# Patient Record
Sex: FEMALE | Race: WHITE
[De-identification: names, ages, dates, MRNs, and addresses within clinical notes are randomized per-mention and may not be internally consistent; named-entity substitution may affect disease eponyms.]

---

## 2019-04-18 ENCOUNTER — HOSPITAL ENCOUNTER (OUTPATIENT)
Dept: HOSPITAL 88 - ER | Age: 39
Setting detail: OBSERVATION
LOS: 2 days | Discharge: HOME | End: 2019-04-20
Attending: INTERNAL MEDICINE | Admitting: INTERNAL MEDICINE
Payer: COMMERCIAL

## 2019-04-18 VITALS — DIASTOLIC BLOOD PRESSURE: 94 MMHG | SYSTOLIC BLOOD PRESSURE: 136 MMHG

## 2019-04-18 VITALS — BODY MASS INDEX: 40.37 KG/M2 | HEIGHT: 70 IN | WEIGHT: 282 LBS

## 2019-04-18 VITALS — SYSTOLIC BLOOD PRESSURE: 136 MMHG | DIASTOLIC BLOOD PRESSURE: 94 MMHG

## 2019-04-18 DIAGNOSIS — K35.80: Primary | ICD-10-CM

## 2019-04-18 LAB
ALBUMIN SERPL-MCNC: 4 G/DL (ref 3.5–5)
ALBUMIN/GLOB SERPL: 1.1 {RATIO} (ref 0.8–2)
ALP SERPL-CCNC: 100 IU/L (ref 40–150)
ALT SERPL-CCNC: 75 IU/L (ref 0–55)
ANION GAP SERPL CALC-SCNC: 17.6 MMOL/L (ref 8–16)
BACTERIA URNS QL MICRO: (no result) /HPF
BASOPHILS NFR BLD AUTO: 0 % (ref 0–1)
BILIRUB UR QL: NEGATIVE
BUN SERPL-MCNC: 12 MG/DL (ref 7–26)
BUN/CREAT SERPL: 16 (ref 6–25)
CALCIUM SERPL-MCNC: 10.5 MG/DL (ref 8.4–10.2)
CHLORIDE SERPL-SCNC: 105 MMOL/L (ref 98–107)
CLARITY UR: (no result)
CO2 SERPL-SCNC: 18 MMOL/L (ref 22–29)
COLOR UR: YELLOW
DEPRECATED RBC URNS MANUAL-ACNC: (no result) /HPF (ref 0–5)
EGFRCR SERPLBLD CKD-EPI 2021: > 60 ML/MIN (ref 60–?)
EOSINOPHIL NFR BLD AUTO: 0.1 % (ref 0–6)
EPI CELLS URNS QL MICRO: (no result) /LPF
ERYTHROCYTE [DISTWIDTH] IN CORD BLOOD: 13.5 % (ref 11.7–14.4)
GLOBULIN PLAS-MCNC: 3.7 G/DL (ref 2.3–3.5)
GLUCOSE SERPLBLD-MCNC: 133 MG/DL (ref 74–118)
HCG UR QL: NEGATIVE
HCT VFR BLD AUTO: 42.6 % (ref 34.2–44.1)
HGB BLD-MCNC: 14.4 G/DL (ref 12–16)
KETONES UR QL STRIP.AUTO: (no result)
LEUKOCYTE ESTERASE UR QL STRIP.AUTO: NEGATIVE
LYMPHOCYTES NFR BLD AUTO: 1.6 % (ref 18–39.1)
MCH RBC QN AUTO: 28.6 PG (ref 28–32)
MCHC RBC AUTO-ENTMCNC: 33.8 G/DL (ref 31–35)
MCV RBC AUTO: 84.7 FL (ref 81–99)
MONOCYTES NFR BLD AUTO: 0.9 % (ref 4.4–11.3)
MUCOUS THREADS URNS QL MICRO: (no result)
NEUTS SEG NFR BLD AUTO: 14 % (ref 38.7–80)
NITRITE UR QL STRIP.AUTO: NEGATIVE
PLATELET # BLD AUTO: 411 X10E3/UL (ref 140–360)
POTASSIUM SERPL-SCNC: 3.6 MMOL/L (ref 3.5–5.1)
PROT UR QL STRIP.AUTO: NEGATIVE
RBC # BLD AUTO: 5.03 X10E6/UL (ref 3.6–5.1)
SODIUM SERPL-SCNC: 137 MMOL/L (ref 136–145)
SP GR UR STRIP: 1.01 (ref 1.01–1.02)
UROBILINOGEN UR STRIP-MCNC: 0.2 MG/DL (ref 0.2–1)
WBC #/AREA URNS HPF: (no result) /HPF (ref 0–5)

## 2019-04-18 PROCEDURE — 44970 LAPAROSCOPY APPENDECTOMY: CPT

## 2019-04-18 PROCEDURE — 85025 COMPLETE CBC W/AUTO DIFF WBC: CPT

## 2019-04-18 PROCEDURE — 74177 CT ABD & PELVIS W/CONTRAST: CPT

## 2019-04-18 PROCEDURE — 99284 EMERGENCY DEPT VISIT MOD MDM: CPT

## 2019-04-18 PROCEDURE — 96376 TX/PRO/DX INJ SAME DRUG ADON: CPT

## 2019-04-18 PROCEDURE — 88304 TISSUE EXAM BY PATHOLOGIST: CPT

## 2019-04-18 PROCEDURE — 80053 COMPREHEN METABOLIC PANEL: CPT

## 2019-04-18 PROCEDURE — 81025 URINE PREGNANCY TEST: CPT

## 2019-04-18 PROCEDURE — 96374 THER/PROPH/DIAG INJ IV PUSH: CPT

## 2019-04-18 PROCEDURE — 36415 COLL VENOUS BLD VENIPUNCTURE: CPT

## 2019-04-18 PROCEDURE — 96375 TX/PRO/DX INJ NEW DRUG ADDON: CPT

## 2019-04-18 PROCEDURE — 81001 URINALYSIS AUTO W/SCOPE: CPT

## 2019-04-18 RX ADMIN — TAZOBACTAM SODIUM AND PIPERACILLIN SODIUM SCH MLS/HR: 375; 3 INJECTION, SOLUTION INTRAVENOUS at 21:33

## 2019-04-18 RX ADMIN — HYDROMORPHONE HYDROCHLORIDE PRN MG: 2 INJECTION INTRAMUSCULAR; INTRAVENOUS; SUBCUTANEOUS at 21:37

## 2019-04-18 RX ADMIN — SODIUM CHLORIDE SCH MLS/HR: 9 INJECTION, SOLUTION INTRAVENOUS at 21:33

## 2019-04-18 RX ADMIN — SODIUM CHLORIDE PRN MG: 900 INJECTION INTRAVENOUS at 21:37

## 2019-04-18 NOTE — DIAGNOSTIC IMAGING REPORT
EXAMINATION: CT of the abdomen and pelvis with contrast.



TECHNIQUE: 

Helical CT images of the abdomen and pelvis were performed from the lung bases

to the lesser trochanters after the intravenous administration of 100 cc of

Omnipaque 300 and the oral administration of none.  Coronal and sagittal

reformatted images were obtained.Dose modulation, iterative reconstruction,

and/or weight based adjustment of the mA/kV was utilized to reduce the

radiation dose to as low as reasonably achievable. 



COMPARISON:  None.



CLINICAL HISTORY:Severe abdominal pain

     

DISCUSSION:



ABDOMEN/PELVIS:



LOWER THORAX:Unremarkable.



HEPATOBILIARY: No focal hepatic lesions.  No intra-or extrahepatic biliary

ductal dilation.  The gallbladder is normal.   



SPLEEN: No splenomegaly.



PANCREAS: No focal masses or ductal dilatation. 



ADRENALS: No adrenal nodules.



KIDNEYS/URETERS: No hydronephrosis, stones, or solid mass lesions.



PELVIC ORGANS/BLADDER: The bladder is normal.  



PERITONEUM/RETROPERITONEUM: No free air or fluid.



LYMPH NODES: No intra-abdominal, retroperitoneal, pelvic or inguinal

lymphadenopathy.



VESSELS: The celiac trunk,superior and inferior mesenteric and bilateral  renal

arteries are patent  The portal, superior mesenteric and splenic veins are

patent.



GI TRACT: Appendix mildly dilated measuring 9 mm with adjacent stranding. Lap

Band appropriate position.



BONES AND SOFT TISSUE: No bony destructive lesions.    No soft tissue

abnormalities.  



IMPRESSION: 



Acute uncomplicated appendicitis.



Signed by: Dr. Andrew Palisch, M.D. on 4/18/2019 5:25 PM

## 2019-04-18 NOTE — XMS REPORT
Patient Summary Document

                             Created on: 2019



SHIRA ENG

External Reference #: [id number]

: 1980

Sex: Female



Demographics







                          Address                   [address] [place], [place] [id number]

 

                          Home Phone                [phone]

 

                          Preferred Language        Unknown

 

                          Marital Status            Unknown

 

                          Caodaism Affiliation     Unknown

 

                          Race                      Unknown

 

                                        Additional Race(s)  

 

                          Ethnic Group              Unknown





Author







                          Author                    [facility]

 

                          Address                   Unknown

 

                          Phone                     Unavailable







Care Team Providers







                    Care Team Member Name    Role                Phone

 

                    EDITA BARNES    Unavailable         Unavailable







Problems

This patient has no known problems.



Allergies, Adverse Reactions, Alerts

This patient has no known allergies or adverse reactions.



Medications

This patient has no known medications.



Results







           Test Description    Test Time    Test Comments    Text Results    Atomic Results    Result

 Comments

 

                CT ABDOMEN/PELVIS W    2019 17:21:00                                                       

                                                   [facility]                        [address] [place] [address]      Patient Name: SHIRA ENG                          
        MR #: [id number]                     : 1980                    
              Age/Sex: 38/F  Acct #: [id number]                              
Req #: [id number]  Adm Physician:                                               
      Ordered by: ANA LUISA BARNES MD                            Report #: [id number]
[id number]        Location: ER                                      Room/Bed:         
           
___________________________________________________________________________________________________
   Procedure: [id number] CT/CT ABDOMEN/PELVIS W  Exam Date:                      
      Exam Time:                                               REPORT STATUS: 
Signed    EXAMINATION: CT of the abdomen and pelvis with contrast.      
TECHNIQUE:    Helical CT images of the abdomen and pelvis were performed from 
the lung bases   to the lesser trochanters after the intravenous administration 
of 100 cc of   Omnipaque 300 and the oral administration of none.  Coronal and 
sagittal   reformatted images were obtained.Dose modulation, iterative 
reconstruction,   and/or weight based adjustment of the mA/kV was utilized to 
reduce the   radiation dose to as low as reasonably achievable.       
COMPARISON:  None.      CLINICAL HISTORY:Severe abdominal pain           
DISCUSSION:      ABDOMEN/PELVIS:      LOWER THORAX:Unremarkable.      HEP
ATOBILIARY: No focal hepatic lesions.  No intra-or extrahepatic biliary   ductal
dilation.  The gallbladder is normal.         SPLEEN: No splenomegaly.      
PANCREAS: No focal masses or ductal dilatation.       ADRENALS: No adrenal 
nodules.      KIDNEYS/URETERS: No hydronephrosis, stones, or solid mass lesions.
     PELVIC ORGANS/BLADDER: The bladder is normal.        
PERITONEUM/RETROPERITONEUM: No free air or fluid.      LYMPH NODES: No intra-
abdominal, retroperitoneal, pelvic or inguinal   lymphadenopathy.      VESSELS: 
The celiac trunk,superior and inferior mesenteric and bilateral  renal   arter
ies are patent  The portal, superior mesenteric and splenic veins are   patent. 
    GI TRACT: Appendix mildly dilated measuring 9 mm with adjacent stranding. 
Lap   Band appropriate position.      BONES AND SOFT TISSUE: No bony destructive
lesions.    No soft tissue   abnormalities.        IMPRESSION:       Acute 
uncomplicated appendicitis.      Signed by: Dr. Andrew Palisch, M.D. on 
2019 5:25 PM        Dictated By: ANDREW R PALISCH MD  Electronically Signed
By: ANDREW R PALISCH MD on 19 172  Transcribed By: DEVEN on 19       COPY TO:   ANA LUISA BARNES MD

## 2019-04-18 NOTE — NUR
Got report from Adwoa, ER nurse. Patient came to unit via wheelchair. Patient is A&Ox3. 
Call light within reach.

## 2019-04-19 VITALS — DIASTOLIC BLOOD PRESSURE: 55 MMHG | SYSTOLIC BLOOD PRESSURE: 109 MMHG

## 2019-04-19 VITALS — SYSTOLIC BLOOD PRESSURE: 117 MMHG | DIASTOLIC BLOOD PRESSURE: 62 MMHG

## 2019-04-19 VITALS — DIASTOLIC BLOOD PRESSURE: 67 MMHG | SYSTOLIC BLOOD PRESSURE: 125 MMHG

## 2019-04-19 VITALS — SYSTOLIC BLOOD PRESSURE: 123 MMHG | DIASTOLIC BLOOD PRESSURE: 68 MMHG

## 2019-04-19 VITALS — SYSTOLIC BLOOD PRESSURE: 129 MMHG | DIASTOLIC BLOOD PRESSURE: 57 MMHG

## 2019-04-19 VITALS — SYSTOLIC BLOOD PRESSURE: 133 MMHG | DIASTOLIC BLOOD PRESSURE: 72 MMHG

## 2019-04-19 RX ADMIN — HYDROMORPHONE HYDROCHLORIDE PRN MG: 2 INJECTION INTRAMUSCULAR; INTRAVENOUS; SUBCUTANEOUS at 05:45

## 2019-04-19 RX ADMIN — HYDROMORPHONE HYDROCHLORIDE PRN MG: 2 INJECTION INTRAMUSCULAR; INTRAVENOUS; SUBCUTANEOUS at 17:12

## 2019-04-19 RX ADMIN — SODIUM CHLORIDE PRN MG: 900 INJECTION INTRAVENOUS at 05:45

## 2019-04-19 RX ADMIN — HYDROCODONE BITARTRATE AND ACETAMINOPHEN PRN EA: 5; 325 TABLET ORAL at 23:05

## 2019-04-19 RX ADMIN — TAZOBACTAM SODIUM AND PIPERACILLIN SODIUM SCH MLS/HR: 375; 3 INJECTION, SOLUTION INTRAVENOUS at 05:45

## 2019-04-19 RX ADMIN — TAZOBACTAM SODIUM AND PIPERACILLIN SODIUM SCH MLS/HR: 375; 3 INJECTION, SOLUTION INTRAVENOUS at 13:39

## 2019-04-19 RX ADMIN — SODIUM CHLORIDE SCH MLS/HR: 9 INJECTION, SOLUTION INTRAVENOUS at 06:00

## 2019-04-19 RX ADMIN — TAZOBACTAM SODIUM AND PIPERACILLIN SODIUM SCH MLS/HR: 375; 3 INJECTION, SOLUTION INTRAVENOUS at 21:10

## 2019-04-19 RX ADMIN — SODIUM CHLORIDE SCH MLS/HR: 9 INJECTION, SOLUTION INTRAVENOUS at 21:10

## 2019-04-19 RX ADMIN — HYDROMORPHONE HYDROCHLORIDE PRN MG: 2 INJECTION INTRAMUSCULAR; INTRAVENOUS; SUBCUTANEOUS at 13:47

## 2019-04-19 RX ADMIN — SODIUM CHLORIDE SCH MLS/HR: 9 INJECTION, SOLUTION INTRAVENOUS at 19:02

## 2019-04-19 RX ADMIN — HYDROMORPHONE HYDROCHLORIDE PRN MG: 2 INJECTION INTRAMUSCULAR; INTRAVENOUS; SUBCUTANEOUS at 01:20

## 2019-04-19 RX ADMIN — HYDROCODONE BITARTRATE AND ACETAMINOPHEN PRN EA: 5; 325 TABLET ORAL at 18:42

## 2019-04-19 NOTE — CONSULTATION
DATE OF CONSULTATION:  04/19/2019  

 

HISTORY OF PRESENT ILLNESS:  The patient is a 38-year-old female, who presents with

complaints of abdominal pain which started yesterday.  The patient says the pain is in

right lower quadrant.  She has had associated nausea.  She came to the emergency room.

Evaluation with CT scan of the abdomen and pelvis revealed findings suggestive of acute

appendicitis.  The patient has not had similar symptoms in the past. 

 

PAST MEDICAL HISTORY:  Significant for previous lap band and bunion surgery.  She has no

chronic medical problems. 

 

ALLERGIES:  SULFA AND THERE WERE NO HOME MEDICATIONS.

 

FAMILY HISTORY:  Noncontributory.

 

SOCIAL HISTORY:  The patient does not smoke cigarettes or drink alcohol.

 

REVIEW OF SYSTEMS:

As stated above, otherwise was negative.

 

PHYSICAL EXAMINATION:

GENERAL:  The patient is awake and alert, in no distress. 

VITAL SIGNS:  Normal.  She is afebrile. 

HEENT:  Unremarkable.  Sclerae are not icteric. 

NECK:  Supple.  No masses. 

LUNGS:  Equal breath sounds are clear bilaterally. 

CARDIAC:  Regular rate and rhythm.  Normal S1, S2 without murmur, S3, or S4.  There is

no jugular venous distention. 

ABDOMEN:  Tender in the right lower quadrant with signs of peritonitis localized to

right lower quadrant.  There is no mass. 

EXTREMITIES:  Warm.  There was no edema.  Pulses were palpable. 

NEUROLOGIC:  Intact.

ASSESSMENT:  A 38-year-old female with acute appendicitis.  She will benefit from

appendectomy.  She will plan to schedule for today.  Procedure was explained to the

patient including risks, benefits, and alternatives.  She understands the procedure.

She had the opportunity to ask questions.  She is aware of the possible need for open

surgery. 

 

Thank you for asking us see Ms. Harrell.

 

 

 

 

______________________________

MD FERNANDO Vick/MODL

D:  04/19/2019 06:29:49

T:  04/19/2019 13:49:30

Job #:  [id number]

## 2019-04-19 NOTE — OPERATIVE REPORT
DATE OF PROCEDURE:  04/19/2019

 

SURGEON:  Erickson Ren MD

 

PREOPERATIVE DIAGNOSIS:  Acute appendicitis.

 

POSTOPERATIVE DIAGNOSIS:  Acute appendicitis.

 

PROCEDURES:  Diagnostic laparoscopy, laparoscopic appendectomy.

 

ASSISTANT:  None.

 

ANESTHESIA:  General.

 

INDICATIONS AND FINDINGS:  The patient is a 38-year-old female, who presents with

complaints of abdominal pain for one day.  Workup revealed probable acute appendicitis.

Surgery, patient with acutely inflamed retrocecal appendicitis. 

 

TECHNIQUE:  After adequate general endotracheal anesthesia with the patient in supine

position the abdomen was prepped and draped in sterile fashion with ChloraPrep solution.

 Skin in the umbilicus was infiltrated with 0.5% Marcaine.  Incision made in the

umbilicus, abdominal wall was elevated and Veress needle was introduced.

Pneumoperitoneum was then created.  A 10 mm trocar and cannula was then passed through

the umbilical wound.  Laparoscopic camera was introduced.  Initial laparoscopy revealed

no free fluid.  Liver appeared normal.  The catheter from the lap band was seen.  A 12

mm trocar and cannula was placed suprapubically and a 5 mm trocar and cannula placed in

the right upper quadrant.  Cecum was identified.  The appendix was seen to be

retrocecal.  The cecum was mobilized by dividing the peritoneal attachments.  The base

of the appendix was identified and cecum was elevated.  A window was created between the

base of the appendix and mesoappendix.  The base of the appendix was divided close to

the cecum with Endo-REJI stapler.  The mesoappendix was then divided with LigaSure device

freeing the appendix completely.  Once it was completely free, it was placed into an

Endopouch and brought out through the suprapubic cannula.  Care was taken to not touch

the abdominal wall.  The appendectomy was inspected for hemostasis, which was seen to be

adequate.  It was irrigated with saline.  All fluid aspirated, inspected once again for

hemostasis which was seen to be adequate.  Instruments and cannulas were then removed.

Pneumoperitoneum was evacuated.  Wounds were then closed.  Fascia in the umbilical and

suprapubic wounds closed with 0-Vicryl.  Skin to all wounds closed with staples.

Sterile dressings applied to each wound.  The patient tolerated the procedure well.

Estimated blood loss was 15 mL.  There were no complications.  All counts were correct.

The patient was taken to the recovery room in satisfactory condition. 

 

 

 

 

______________________________

MD FERNANDO Vick/MODL

D:  04/19/2019 09:32:26

T:  04/19/2019 15:28:59

Job #:  [id number]

 

cc:            Mika Harrington MD

## 2019-04-19 NOTE — NUR
rounded with night shift nurse, patient aware of change. call bell within reach and bed in 
lowest position.

## 2019-04-19 NOTE — NUR
patient returned to floor via stretcher, alert and oriented. call bell within reach and bed 
in lowest position.

## 2019-04-20 VITALS — SYSTOLIC BLOOD PRESSURE: 113 MMHG | DIASTOLIC BLOOD PRESSURE: 57 MMHG

## 2019-04-20 VITALS — DIASTOLIC BLOOD PRESSURE: 71 MMHG | SYSTOLIC BLOOD PRESSURE: 131 MMHG

## 2019-04-20 VITALS — SYSTOLIC BLOOD PRESSURE: 106 MMHG | DIASTOLIC BLOOD PRESSURE: 71 MMHG

## 2019-04-20 VITALS — SYSTOLIC BLOOD PRESSURE: 144 MMHG | DIASTOLIC BLOOD PRESSURE: 67 MMHG

## 2019-04-20 RX ADMIN — HYDROCODONE BITARTRATE AND ACETAMINOPHEN PRN EA: 5; 325 TABLET ORAL at 05:07

## 2019-04-20 RX ADMIN — TAZOBACTAM SODIUM AND PIPERACILLIN SODIUM SCH MLS/HR: 375; 3 INJECTION, SOLUTION INTRAVENOUS at 05:03

## 2019-04-20 RX ADMIN — HYDROCODONE BITARTRATE AND ACETAMINOPHEN PRN EA: 5; 325 TABLET ORAL at 09:00

## 2019-04-20 RX ADMIN — SODIUM CHLORIDE SCH MLS/HR: 9 INJECTION, SOLUTION INTRAVENOUS at 12:00

## 2019-04-20 RX ADMIN — HYDROCODONE BITARTRATE AND ACETAMINOPHEN PRN EA: 5; 325 TABLET ORAL at 12:32

## 2019-04-20 NOTE — NUR
report received from night shift nurse, patient aware of change and in no distress. call 
bell within reach and bed in lowest position.

## 2019-04-20 NOTE — NUR
patient alert and oriented. discharge instructions given at this time, patient verbalized 
understanding. IV discontinued, catheter in tact and small dressing applied. Patient refused 
wheelchair assistance and will be escorted to personal auto for significant other to drive 
home.

## 2019-04-22 NOTE — DISCHARGE SUMMARY
DISCHARGE DIAGNOSES:  

1. Acute appendicitis.

2. Status post laparoscopic appendectomy with Dr. Ren.

 

COMPLICATIONS:  None.

 

DISCHARGE FOLLOW UP:  In 2 weeks with Dr. Ren.

 

HISTORY OF PRESENT ILLNESS AND HOSPITAL COURSE:  See hospital chart for full details.

The patient is a young lady who presented lady with acute abdominal pain, where she is

found to have acute appendicitis.  She was placed on IV antibiotics.  The pain did not

resolve and was then taken to the OR by Dr. Ren who performed a laparoscopic

appendectomy without any complications.  She was monitored overnight postoperatively

where she did very well, felt 

much better and she is able to be discharged home once Dr. Ren felt it.  Follow up in

two weeks with Dr. Ren.  Please see hospital chart for full details. 

 

 

 

 

______________________________

MD JAIR Dennis/MONALISA

D:  04/22/2019 05:10:44

T:  04/22/2019 05:27:56

Job #:  [id number]

## 2019-08-10 ENCOUNTER — HOSPITAL ENCOUNTER (EMERGENCY)
Dept: HOSPITAL 88 - ER | Age: 39
Discharge: HOME | End: 2019-08-10
Payer: COMMERCIAL

## 2019-08-10 VITALS — HEIGHT: 69 IN | BODY MASS INDEX: 38.51 KG/M2 | WEIGHT: 260 LBS

## 2019-08-10 DIAGNOSIS — E28.2: ICD-10-CM

## 2019-08-10 DIAGNOSIS — G89.29: ICD-10-CM

## 2019-08-10 DIAGNOSIS — E03.9: ICD-10-CM

## 2019-08-10 DIAGNOSIS — M54.5: Primary | ICD-10-CM

## 2019-08-10 DIAGNOSIS — Z98.84: ICD-10-CM

## 2019-08-10 LAB
ALBUMIN SERPL-MCNC: 3.6 G/DL (ref 3.5–5)
ALBUMIN/GLOB SERPL: 1.3 {RATIO} (ref 0.8–2)
ALP SERPL-CCNC: 90 IU/L (ref 40–150)
ALT SERPL-CCNC: 45 IU/L (ref 0–55)
ANION GAP SERPL CALC-SCNC: 11.8 MMOL/L (ref 8–16)
BACTERIA URNS QL MICRO: (no result) /HPF
BASOPHILS # BLD AUTO: 0 10*3/UL (ref 0–0.1)
BASOPHILS NFR BLD AUTO: 0.1 % (ref 0–1)
BILIRUB UR QL: NEGATIVE
BUN SERPL-MCNC: 17 MG/DL (ref 7–26)
BUN/CREAT SERPL: 25 (ref 6–25)
CALCIUM SERPL-MCNC: 9.9 MG/DL (ref 8.4–10.2)
CHLORIDE SERPL-SCNC: 108 MMOL/L (ref 98–107)
CLARITY UR: CLEAR
CO2 SERPL-SCNC: 23 MMOL/L (ref 22–29)
COLOR UR: YELLOW
DEPRECATED NEUTROPHILS # BLD AUTO: 4.6 10*3/UL (ref 2.1–6.9)
DEPRECATED RBC URNS MANUAL-ACNC: (no result) /HPF (ref 0–5)
EGFRCR SERPLBLD CKD-EPI 2021: > 60 ML/MIN (ref 60–?)
EOSINOPHIL # BLD AUTO: 0.1 10*3/UL (ref 0–0.4)
EOSINOPHIL NFR BLD AUTO: 0.7 % (ref 0–6)
EPI CELLS URNS QL MICRO: (no result) /LPF
ERYTHROCYTE [DISTWIDTH] IN CORD BLOOD: 13.9 % (ref 11.7–14.4)
GLOBULIN PLAS-MCNC: 2.8 G/DL (ref 2.3–3.5)
GLUCOSE SERPLBLD-MCNC: 107 MG/DL (ref 74–118)
HCG UR QL: NEGATIVE
HCT VFR BLD AUTO: 40 % (ref 34.2–44.1)
HGB BLD-MCNC: 13.5 G/DL (ref 12–16)
KETONES UR QL STRIP.AUTO: (no result)
LEUKOCYTE ESTERASE UR QL STRIP.AUTO: NEGATIVE
LYMPHOCYTES # BLD: 2.2 10*3/UL (ref 1–3.2)
LYMPHOCYTES NFR BLD AUTO: 29.1 % (ref 18–39.1)
MCH RBC QN AUTO: 28.1 PG (ref 28–32)
MCHC RBC AUTO-ENTMCNC: 33.8 G/DL (ref 31–35)
MCV RBC AUTO: 83.3 FL (ref 81–99)
MONOCYTES # BLD AUTO: 0.6 10*3/UL (ref 0.2–0.8)
MONOCYTES NFR BLD AUTO: 8 % (ref 4.4–11.3)
MUCOUS THREADS URNS QL MICRO: (no result)
NEUTS SEG NFR BLD AUTO: 61.8 % (ref 38.7–80)
NITRITE UR QL STRIP.AUTO: NEGATIVE
PLATELET # BLD AUTO: 363 X10E3/UL (ref 140–360)
POTASSIUM SERPL-SCNC: 3.8 MMOL/L (ref 3.5–5.1)
PROT UR QL STRIP.AUTO: (no result)
RBC # BLD AUTO: 4.8 X10E6/UL (ref 3.6–5.1)
SODIUM SERPL-SCNC: 139 MMOL/L (ref 136–145)
SP GR UR STRIP: 1.02 (ref 1.01–1.02)
UROBILINOGEN UR STRIP-MCNC: 0.2 MG/DL (ref 0.2–1)
WBC #/AREA URNS HPF: (no result) /HPF (ref 0–5)

## 2019-08-10 PROCEDURE — 93005 ELECTROCARDIOGRAM TRACING: CPT

## 2019-08-10 PROCEDURE — 81025 URINE PREGNANCY TEST: CPT

## 2019-08-10 PROCEDURE — 99284 EMERGENCY DEPT VISIT MOD MDM: CPT

## 2019-08-10 PROCEDURE — 36415 COLL VENOUS BLD VENIPUNCTURE: CPT

## 2019-08-10 PROCEDURE — 71046 X-RAY EXAM CHEST 2 VIEWS: CPT

## 2019-08-10 PROCEDURE — 80053 COMPREHEN METABOLIC PANEL: CPT

## 2019-08-10 PROCEDURE — 81001 URINALYSIS AUTO W/SCOPE: CPT

## 2019-08-10 PROCEDURE — 85379 FIBRIN DEGRADATION QUANT: CPT

## 2019-08-10 PROCEDURE — 85025 COMPLETE CBC W/AUTO DIFF WBC: CPT

## 2019-08-10 NOTE — DIAGNOSTIC IMAGING REPORT
EXAMINATION:  CHEST 2 VIEWS    



INDICATION:      

^r/o pne

^[id number]

^[id number]



COMPARISON:  None

     

FINDINGS:  PA and lateral views



TUBES and LINES:  None.



LUNGS:  Lungs are well inflated.  There is no evidence of pneumonia or

pulmonary edema.



PLEURA:  No pleural effusion or pneumothorax.



HEART AND MEDIASTINUM:  The cardiomediastinal silhouette is unremarkable.    



BONES AND SOFT TISSUES:  No acute osseous lesion.  Soft tissues are

unremarkable.



UPPER ABDOMEN: No free air under the diaphragm. Gastric band in place.



IMPRESSION: 

No acute thoracic abnormality.





Signed by: Dr. Nnamdi Niño MD on 8/10/2019 12:56 PM